# Patient Record
Sex: FEMALE | Race: WHITE | NOT HISPANIC OR LATINO | Employment: FULL TIME | URBAN - METROPOLITAN AREA
[De-identification: names, ages, dates, MRNs, and addresses within clinical notes are randomized per-mention and may not be internally consistent; named-entity substitution may affect disease eponyms.]

---

## 2017-06-29 ENCOUNTER — CLINICAL SUPPORT (OUTPATIENT)
Dept: GENETICS | Facility: HOSPITAL | Age: 32
End: 2017-06-29

## 2017-06-29 DIAGNOSIS — Z80.3 FAMILY HISTORY OF BREAST CANCER: Primary | ICD-10-CM

## 2017-06-30 NOTE — PROGRESS NOTES
[facility]  GENETIC COUNSELING CENTER  Hereditary Cancer Program  PHONE: [phone]      Melissa Cisneros is a 32 year-old female who was referred for genetic counseling due to a family history of breast cancer. She was initially seen for genetic counseling on 6/29/17 and was accompanied by her mother and sister. Her current screening includes annual clinical breast exams and she had her first annual mammogram this year at age 32.  Ms. Cisneros was 14 years old at menarche and is nulliparous. Ms. Cisneros was interested in discussing her risks for a hereditary cancer syndrome as well as genetic testing options. We discussed Hereditary Breast and Ovarian Cancer syndrome (BRCA1/2) specifically as well as the option of multigene testing via the BreastNext Panel. The genes on this panel include TIFFANI, BARD1, BRCA1, BRCA2, BRIP1, CDH1, CHEK2, MRE11A, MUTYH, NBN, NF1, PALB2, PTEN, RAD50, RAD51C, RAD51D, and TP53. Ms. Cisneros is currently considering genetic testing and will contact us when she decides to pursue it.     PERTINENT FAMILY HISTORY: (see attached pedigree)  Paternal Grandmother:   Breast cancer, 40  Paternal Great-Aunt:    Breast cancer, late 50s  Paternal 1st Cousin Once-Removed:  Male breast cancer, 63  Paternal Grandfather:    Stomach cancer, 60s  Maternal Great-Aunt:    Breast cancer, 60s    We do not have medical records to confirm the cancer diagnoses in the family.    RISK ASSESSMENT:  Ms. Cisneros’s family history of breast cancer led to her concern for a hereditary breast cancer syndrome.   The family does meet NCCN guidelines criteria for BRCA1/2 testing based on her grandmother’s breast cancer diagnosis at age 40. We did discuss the availability of multigene panels that would evaluate BRCA1/2 and a number of other genes associated with hereditary breast cancer risk.  This testing would be most informative if first offered to an affected relative. In cases where affected individuals are  unavailable for testing, it is reasonable to offer testing to an unaffected individual, like Ms. Cisneros. If negative genetic testing, Ms. Cisneros’s management should be guided by family history risk assessments.      We discussed that Ms. Cisneros’s breast cancer risk could change depending on results of genetic testing. If Ms. Cisneros decides to not pursue genetic testing, a breast cancer risk assessment can be run for her using computer models ([employer]). Risk assessment models take into account multiple factors, including personal health history and family history. In general, a lifetime risk above 20% is considered to be “high risk” where increased screening is warranted. This risk assessment is based on the family history information provided at the time of the appointment.  The assessment could change in the future should new information be obtained.     GENETIC COUNSELING (60 minutes): We reviewed the family history information in detail.  Cancer is very common; approximately 1 in 3 individuals will develop cancer within a lifetime.  It is not uncommon to see multiple individuals within a family with cancer given the high chance for a person to develop cancer.  The interaction of many factors determines each person’s personal risk, including age, family or personal history of cancer, and external factors such as diet and environmental exposures.  Exactly how these various risk factors interact in an individual is unclear.  Most often, we believe cancer to be a multifactorial disease caused by an accumulation of genetic alterations acquired over our lifetime, with environmental factors acting in the development of a malignancy.    Cancer cases follow three general patterns: sporadic, familial, and hereditary.  While most breast cancer is sporadic, some cases appear to occur in family clusters.  These cases are said to be familial and account for 10-20% of breast cancer cases.  Familial cases may be  due to a combination of shared genes and environmental factors among family members.  In even fewer families, the cancer is said to be inherited, and the genes responsible for the cancer are known.      Family histories typical of hereditary cancer syndromes usually include multiple first- and second-degree relatives diagnosed with cancer types that define a syndrome.  These cases tend to be diagnosed at younger-than-expected ages and can be bilateral or multifocal.  The cancer in these families follows an autosomal dominant inheritance pattern, which indicates the likely presence of a mutation in a cancer susceptibility gene.  Children and siblings of an individual believed to carry this mutation have a 50% chance of inheriting that mutation, thereby inheriting the increased risk to develop cancer.  These mutations can be passed down from the maternal or the paternal lineage.    Hereditary breast cancer accounts for 5-10% of all cases of breast cancer.  A significant proportion of hereditary breast cancer can be attributed to mutations in the BRCA1 and BRCA2 genes.  Mutations in these genes confer an increased risk for breast cancer, ovarian cancer, male breast cancer, prostate cancer, and pancreatic cancer.  Women with a BRCA1 or BRCA2 mutation have up to an 87% lifetime risk of breast cancer, and up to a 44% lifetime risk of ovarian cancer. We also discussed multi-gene panel testing that is available.   A panel would test for BRCA1/2 as well as additional genes that have been associated with breast cancer risk.  These additional genes have varying degrees of risk associated, and some have also been associated with increased risks for other cancers.  There are limitations to this testing, one being that some of the genes included on these panels have been more recently described, therefore there may be less data regarding the associated risks and there may not be established management guidelines at this time like  there are for BRCA1/2.    There are other, more rare, hereditary cancer syndromes. Some of these conditions have well defined cancer risks and established management guidelines.  Other genes that can be tested for have been more recently described, and there may be less data regarding the risks and therefore may not have established management guidelines.  We discussed these limitations at length.      GENETIC TESTING:  The risks, benefits and limitations of genetic testing and implications for clinical management following testing were reviewed.  DNA test results can influence decisions regarding screening and prevention.  Genetic testing can have significant psychological implications for both individuals and families. Also discussed was the possibility of employment and insurance discrimination based on genetic test results and the laws in place to prevent this, as well as the limitations of these laws.      We discussed panel testing, which would involve testing for BRCA1/2 as well as several other genes. The benefits and limitations of genetic testing were discussed. We discussed the possibility that, in some cases, genetic test results may be informative or may be ambiguous due to the identification of a genetic variant. These variants may or may not be associated with an increased cancer risk. With multigene panel testing, it is not uncommon for a variant of uncertain significance (VUS) to be identified.  If a VUS is identified, testing family members is not recommended and screening recommendations are made based on the family history.  The laboratories that perform genetic testing work to reclassify the VUS and send out an amended report if and when a VUS is reclassified.  The majority of variant findings are ultimately reclassified to a negative result.     The implications of a positive or negative test result were discussed. The importance of initiating testing on an affected family member was  emphasized.  In general, a negative genetic test is most informative if a mutation has first been established in an affected member of the family.  In cases where an affected individual is not available for testing, it is appropriate to offer testing to an unaffected individual.  After our discussion, Ms. Cisneros is currently considering genetic testing and will contact us when she decides to pursue it; however, she is still considered at an increased risk for breast cancer based on family history.    PLAN: Genetic counseling remains available to Ms. Cisneros. She plans to contact us when she is ready to pursue testing.  If Ms. Cisneros decides not to pursue testing, we encourage her to contact us for a breast cancer risk assessment.   If Ms. Cisneros has any questions, concerns, or updates to the family history she is welcome to call us.       Татьяна Vogel MS      Genetic Counselor          cc: MD Amber Guo MD